# Patient Record
Sex: MALE | Race: WHITE | NOT HISPANIC OR LATINO | Employment: UNEMPLOYED | URBAN - METROPOLITAN AREA
[De-identification: names, ages, dates, MRNs, and addresses within clinical notes are randomized per-mention and may not be internally consistent; named-entity substitution may affect disease eponyms.]

---
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
## 2021-06-06 ENCOUNTER — HOSPITAL ENCOUNTER (EMERGENCY)
Facility: HOSPITAL | Age: 21
Discharge: HOME | End: 2021-06-07
Attending: EMERGENCY MEDICINE | Admitting: EMERGENCY MEDICINE

## 2021-06-06 ENCOUNTER — APPOINTMENT (OUTPATIENT)
Dept: CT IMAGING | Facility: HOSPITAL | Age: 21
End: 2021-06-06

## 2021-06-06 DIAGNOSIS — N20.0 KIDNEY STONE ON LEFT SIDE: Primary | ICD-10-CM

## 2021-06-06 LAB
ALBUMIN SERPL-MCNC: 4.6 G/DL (ref 3.5–5.2)
ALBUMIN/GLOB SERPL: 1.5 G/DL
ALP SERPL-CCNC: 83 U/L (ref 39–117)
ALT SERPL W P-5'-P-CCNC: 54 U/L (ref 1–41)
ANION GAP SERPL CALCULATED.3IONS-SCNC: 10.4 MMOL/L (ref 5–15)
AST SERPL-CCNC: 24 U/L (ref 1–40)
BACTERIA UR QL AUTO: NORMAL /HPF
BASOPHILS # BLD AUTO: 0.06 10*3/MM3 (ref 0–0.2)
BASOPHILS NFR BLD AUTO: 0.5 % (ref 0–1.5)
BILIRUB SERPL-MCNC: 0.6 MG/DL (ref 0–1.2)
BILIRUB UR QL STRIP: NEGATIVE
BUN SERPL-MCNC: 10 MG/DL (ref 6–20)
BUN/CREAT SERPL: 9.3 (ref 7–25)
CALCIUM SPEC-SCNC: 9.4 MG/DL (ref 8.6–10.5)
CHLORIDE SERPL-SCNC: 107 MMOL/L (ref 98–107)
CLARITY UR: CLEAR
CO2 SERPL-SCNC: 26.6 MMOL/L (ref 22–29)
COLOR UR: YELLOW
CREAT SERPL-MCNC: 1.08 MG/DL (ref 0.76–1.27)
DEPRECATED RDW RBC AUTO: 37.4 FL (ref 37–54)
EOSINOPHIL # BLD AUTO: 0.34 10*3/MM3 (ref 0–0.4)
EOSINOPHIL NFR BLD AUTO: 3 % (ref 0.3–6.2)
ERYTHROCYTE [DISTWIDTH] IN BLOOD BY AUTOMATED COUNT: 12.6 % (ref 12.3–15.4)
GFR SERPL CREATININE-BSD FRML MDRD: 88 ML/MIN/1.73
GLOBULIN UR ELPH-MCNC: 3.1 GM/DL
GLUCOSE SERPL-MCNC: 108 MG/DL (ref 65–99)
GLUCOSE UR STRIP-MCNC: NEGATIVE MG/DL
HCT VFR BLD AUTO: 44.3 % (ref 37.5–51)
HGB BLD-MCNC: 15.1 G/DL (ref 13–17.7)
HGB UR QL STRIP.AUTO: ABNORMAL
HYALINE CASTS UR QL AUTO: NORMAL /LPF
IMM GRANULOCYTES # BLD AUTO: 0.05 10*3/MM3 (ref 0–0.05)
IMM GRANULOCYTES NFR BLD AUTO: 0.4 % (ref 0–0.5)
KETONES UR QL STRIP: ABNORMAL
LEUKOCYTE ESTERASE UR QL STRIP.AUTO: NEGATIVE
LIPASE SERPL-CCNC: 19 U/L (ref 13–60)
LYMPHOCYTES # BLD AUTO: 4.08 10*3/MM3 (ref 0.7–3.1)
LYMPHOCYTES NFR BLD AUTO: 35.6 % (ref 19.6–45.3)
MCH RBC QN AUTO: 28.2 PG (ref 26.6–33)
MCHC RBC AUTO-ENTMCNC: 34.1 G/DL (ref 31.5–35.7)
MCV RBC AUTO: 82.8 FL (ref 79–97)
MONOCYTES # BLD AUTO: 0.66 10*3/MM3 (ref 0.1–0.9)
MONOCYTES NFR BLD AUTO: 5.8 % (ref 5–12)
NEUTROPHILS NFR BLD AUTO: 54.7 % (ref 42.7–76)
NEUTROPHILS NFR BLD AUTO: 6.26 10*3/MM3 (ref 1.7–7)
NITRITE UR QL STRIP: NEGATIVE
NRBC BLD AUTO-RTO: 0 /100 WBC (ref 0–0.2)
PH UR STRIP.AUTO: <=5 [PH] (ref 5–8)
PLATELET # BLD AUTO: 273 10*3/MM3 (ref 140–450)
PMV BLD AUTO: 10.2 FL (ref 6–12)
POTASSIUM SERPL-SCNC: 3.8 MMOL/L (ref 3.5–5.2)
PROT SERPL-MCNC: 7.7 G/DL (ref 6–8.5)
PROT UR QL STRIP: NEGATIVE
RBC # BLD AUTO: 5.35 10*6/MM3 (ref 4.14–5.8)
RBC # UR: NORMAL /HPF
REF LAB TEST METHOD: NORMAL
SODIUM SERPL-SCNC: 144 MMOL/L (ref 136–145)
SP GR UR STRIP: >=1.03 (ref 1–1.03)
SQUAMOUS #/AREA URNS HPF: NORMAL /HPF
UROBILINOGEN UR QL STRIP: ABNORMAL
WBC # BLD AUTO: 11.45 10*3/MM3 (ref 3.4–10.8)
WBC UR QL AUTO: NORMAL /HPF

## 2021-06-06 PROCEDURE — 81001 URINALYSIS AUTO W/SCOPE: CPT

## 2021-06-06 PROCEDURE — 83690 ASSAY OF LIPASE: CPT

## 2021-06-06 PROCEDURE — 25010000002 HYDROMORPHONE PER 4 MG: Performed by: EMERGENCY MEDICINE

## 2021-06-06 PROCEDURE — 80053 COMPREHEN METABOLIC PANEL: CPT

## 2021-06-06 PROCEDURE — 96375 TX/PRO/DX INJ NEW DRUG ADDON: CPT

## 2021-06-06 PROCEDURE — 96374 THER/PROPH/DIAG INJ IV PUSH: CPT

## 2021-06-06 PROCEDURE — 25010000002 ONDANSETRON PER 1 MG: Performed by: EMERGENCY MEDICINE

## 2021-06-06 PROCEDURE — 74176 CT ABD & PELVIS W/O CONTRAST: CPT

## 2021-06-06 PROCEDURE — 99283 EMERGENCY DEPT VISIT LOW MDM: CPT

## 2021-06-06 PROCEDURE — 85025 COMPLETE CBC W/AUTO DIFF WBC: CPT

## 2021-06-06 RX ORDER — ONDANSETRON 2 MG/ML
4 INJECTION INTRAMUSCULAR; INTRAVENOUS ONCE
Status: COMPLETED | OUTPATIENT
Start: 2021-06-06 | End: 2021-06-06

## 2021-06-06 RX ORDER — SODIUM CHLORIDE 0.9 % (FLUSH) 0.9 %
10 SYRINGE (ML) INJECTION AS NEEDED
Status: DISCONTINUED | OUTPATIENT
Start: 2021-06-06 | End: 2021-06-07 | Stop reason: HOSPADM

## 2021-06-06 RX ORDER — HYDROMORPHONE HYDROCHLORIDE 1 MG/ML
0.5 INJECTION, SOLUTION INTRAMUSCULAR; INTRAVENOUS; SUBCUTANEOUS ONCE
Status: COMPLETED | OUTPATIENT
Start: 2021-06-06 | End: 2021-06-06

## 2021-06-06 RX ADMIN — ONDANSETRON 4 MG: 2 INJECTION INTRAMUSCULAR; INTRAVENOUS at 23:32

## 2021-06-06 RX ADMIN — HYDROMORPHONE HYDROCHLORIDE 0.5 MG: 1 INJECTION, SOLUTION INTRAMUSCULAR; INTRAVENOUS; SUBCUTANEOUS at 23:32

## 2021-06-06 RX ADMIN — SODIUM CHLORIDE 1000 ML: 9 INJECTION, SOLUTION INTRAVENOUS at 23:31

## 2021-06-07 VITALS
HEART RATE: 53 BPM | HEIGHT: 74 IN | OXYGEN SATURATION: 99 % | SYSTOLIC BLOOD PRESSURE: 125 MMHG | DIASTOLIC BLOOD PRESSURE: 76 MMHG | TEMPERATURE: 97.3 F | RESPIRATION RATE: 16 BRPM

## 2021-06-07 LAB
HOLD SPECIMEN: NORMAL
HOLD SPECIMEN: NORMAL
WHOLE BLOOD HOLD SPECIMEN: NORMAL
WHOLE BLOOD HOLD SPECIMEN: NORMAL

## 2021-06-07 RX ORDER — ONDANSETRON 4 MG/1
4 TABLET, ORALLY DISINTEGRATING ORAL 4 TIMES DAILY PRN
Qty: 10 TABLET | Refills: 0 | Status: SHIPPED | OUTPATIENT
Start: 2021-06-07 | End: 2021-06-23 | Stop reason: SDUPTHER

## 2021-06-07 RX ORDER — OXYCODONE HYDROCHLORIDE AND ACETAMINOPHEN 5; 325 MG/1; MG/1
1 TABLET ORAL EVERY 6 HOURS PRN
Qty: 15 TABLET | Refills: 0 | Status: SHIPPED | OUTPATIENT
Start: 2021-06-07 | End: 2021-06-23

## 2021-06-07 NOTE — ED TRIAGE NOTES
Pt to ER from home via PV with c/o L flank pain starting approx 20 min ago. Pt reports some nausea, no vomiting yet.       Pt masked in triage, staff in appropriate ppe.

## 2021-06-07 NOTE — ED PROVIDER NOTES
EMERGENCY DEPARTMENT ENCOUNTER    Room Number:  20/20  PCP: Teri Jack APRN  Historian: Patient  History Limited By: Nothing      HPI  Chief Complaint: Left flank pain  Context: Santos Mendoza is a 20 y.o. male who presents to the ED c/o left flank pain.  Patient states symptoms started about 30 to 45 minutes ago.  Nauseated without vomiting.  Has no history of kidney stones but has had appendicitis.  Patient has had no fevers.  Some mild dysuria but no hematuria.  Pain does go around to the front.      Location: Left flank pain  Radiation: Left abdomen  Character: Aching  Duration: 45 minutes  Severity: Moderate  Progression: Waxes and wanes  Aggravating Factors: Nothing  Alleviating Factors: Nothing        MEDICAL RECORD REVIEW    No prior visits in epic for kidney stone          PAST MEDICAL HISTORY  Active Ambulatory Problems     Diagnosis Date Noted   • Abdominal pain 02/15/2017   • Heel pain, bilateral 02/15/2017   • Flu-like symptoms 02/15/2017   • Influenza A 02/15/2017     Resolved Ambulatory Problems     Diagnosis Date Noted   • No Resolved Ambulatory Problems     No Additional Past Medical History         PAST SURGICAL HISTORY  No past surgical history on file.      FAMILY HISTORY  Family History   Problem Relation Age of Onset   • Migraines Father          SOCIAL HISTORY  Social History     Socioeconomic History   • Marital status: Single     Spouse name: Not on file   • Number of children: Not on file   • Years of education: Not on file   • Highest education level: Not on file   Tobacco Use   • Smoking status: Never Smoker   Substance and Sexual Activity   • Alcohol use: No   • Drug use: No         ALLERGIES  Nsaids        REVIEW OF SYSTEMS  Review of Systems   Constitutional: Negative for activity change, appetite change and fever.   HENT: Negative for congestion and sore throat.    Eyes: Negative.    Respiratory: Negative for cough and shortness of breath.    Cardiovascular: Negative for chest pain  and leg swelling.   Gastrointestinal: Positive for abdominal pain and nausea. Negative for diarrhea and vomiting.   Endocrine: Negative.    Genitourinary: Positive for flank pain. Negative for decreased urine volume and dysuria.   Musculoskeletal: Negative for neck pain.   Skin: Negative for rash and wound.   Allergic/Immunologic: Negative.    Neurological: Negative for weakness, numbness and headaches.   Hematological: Negative.    Psychiatric/Behavioral: Negative.    All other systems reviewed and are negative.           PHYSICAL EXAM  ED Triage Vitals [06/06/21 2314]   Temp Heart Rate Resp BP SpO2   97.3 °F (36.3 °C) 53 18 -- 99 %      Temp src Heart Rate Source Patient Position BP Location FiO2 (%)   -- -- -- -- --       Physical Exam  Vitals and nursing note reviewed.   Constitutional:       General: He is not in acute distress.  HENT:      Head: Normocephalic and atraumatic.   Eyes:      Pupils: Pupils are equal, round, and reactive to light.   Cardiovascular:      Rate and Rhythm: Normal rate and regular rhythm.      Heart sounds: Normal heart sounds.   Pulmonary:      Effort: Pulmonary effort is normal. No respiratory distress.      Breath sounds: Normal breath sounds.   Abdominal:      Palpations: Abdomen is soft.      Tenderness: There is no abdominal tenderness. There is left CVA tenderness. There is no guarding or rebound.   Musculoskeletal:         General: Normal range of motion.      Cervical back: Normal range of motion and neck supple.   Skin:     General: Skin is warm and dry.   Neurological:      Mental Status: He is alert and oriented to person, place, and time.      Sensory: Sensation is intact.   Psychiatric:         Mood and Affect: Mood and affect normal.       Patient was wearing a face mask when I entered the room and they continued to wear a mask throughout their stay in the ED.  I wore PPE, including  gloves, face mask with shield or face mask with goggles whenever I was in the room with  patient.       LAB RESULTS  Recent Results (from the past 24 hour(s))   Comprehensive Metabolic Panel    Collection Time: 06/06/21 11:27 PM    Specimen: Blood   Result Value Ref Range    Glucose 108 (H) 65 - 99 mg/dL    BUN 10 6 - 20 mg/dL    Creatinine 1.08 0.76 - 1.27 mg/dL    Sodium 144 136 - 145 mmol/L    Potassium 3.8 3.5 - 5.2 mmol/L    Chloride 107 98 - 107 mmol/L    CO2 26.6 22.0 - 29.0 mmol/L    Calcium 9.4 8.6 - 10.5 mg/dL    Total Protein 7.7 6.0 - 8.5 g/dL    Albumin 4.60 3.50 - 5.20 g/dL    ALT (SGPT) 54 (H) 1 - 41 U/L    AST (SGOT) 24 1 - 40 U/L    Alkaline Phosphatase 83 39 - 117 U/L    Total Bilirubin 0.6 0.0 - 1.2 mg/dL    eGFR Non African Amer 88 >60 mL/min/1.73    Globulin 3.1 gm/dL    A/G Ratio 1.5 g/dL    BUN/Creatinine Ratio 9.3 7.0 - 25.0    Anion Gap 10.4 5.0 - 15.0 mmol/L   Lipase    Collection Time: 06/06/21 11:27 PM    Specimen: Blood   Result Value Ref Range    Lipase 19 13 - 60 U/L   Urinalysis With Microscopic If Indicated (No Culture) - Urine, Clean Catch    Collection Time: 06/06/21 11:27 PM    Specimen: Urine, Clean Catch   Result Value Ref Range    Color, UA Yellow Yellow, Straw    Appearance, UA Clear Clear    pH, UA <=5.0 5.0 - 8.0    Specific Gravity, UA >=1.030 1.005 - 1.030    Glucose, UA Negative Negative    Ketones, UA Trace (A) Negative    Bilirubin, UA Negative Negative    Blood, UA Large (3+) (A) Negative    Protein, UA Negative Negative    Leuk Esterase, UA Negative Negative    Nitrite, UA Negative Negative    Urobilinogen, UA 0.2 E.U./dL 0.2 - 1.0 E.U./dL   Light Blue Top    Collection Time: 06/06/21 11:27 PM   Result Value Ref Range    Extra Tube hold for add-on    Green Top (Gel)    Collection Time: 06/06/21 11:27 PM   Result Value Ref Range    Extra Tube Hold for add-ons.    Lavender Top    Collection Time: 06/06/21 11:27 PM   Result Value Ref Range    Extra Tube hold for add-on    Gold Top - SST    Collection Time: 06/06/21 11:27 PM   Result Value Ref Range    Extra  Tube Hold for add-ons.    CBC Auto Differential    Collection Time: 06/06/21 11:27 PM    Specimen: Blood   Result Value Ref Range    WBC 11.45 (H) 3.40 - 10.80 10*3/mm3    RBC 5.35 4.14 - 5.80 10*6/mm3    Hemoglobin 15.1 13.0 - 17.7 g/dL    Hematocrit 44.3 37.5 - 51.0 %    MCV 82.8 79.0 - 97.0 fL    MCH 28.2 26.6 - 33.0 pg    MCHC 34.1 31.5 - 35.7 g/dL    RDW 12.6 12.3 - 15.4 %    RDW-SD 37.4 37.0 - 54.0 fl    MPV 10.2 6.0 - 12.0 fL    Platelets 273 140 - 450 10*3/mm3    Neutrophil % 54.7 42.7 - 76.0 %    Lymphocyte % 35.6 19.6 - 45.3 %    Monocyte % 5.8 5.0 - 12.0 %    Eosinophil % 3.0 0.3 - 6.2 %    Basophil % 0.5 0.0 - 1.5 %    Immature Grans % 0.4 0.0 - 0.5 %    Neutrophils, Absolute 6.26 1.70 - 7.00 10*3/mm3    Lymphocytes, Absolute 4.08 (H) 0.70 - 3.10 10*3/mm3    Monocytes, Absolute 0.66 0.10 - 0.90 10*3/mm3    Eosinophils, Absolute 0.34 0.00 - 0.40 10*3/mm3    Basophils, Absolute 0.06 0.00 - 0.20 10*3/mm3    Immature Grans, Absolute 0.05 0.00 - 0.05 10*3/mm3    nRBC 0.0 0.0 - 0.2 /100 WBC   Urinalysis, Microscopic Only - Urine, Clean Catch    Collection Time: 06/06/21 11:27 PM    Specimen: Urine, Clean Catch   Result Value Ref Range    RBC, UA 0-2 None Seen, 0-2 /HPF    WBC, UA 0-2 None Seen, 0-2 /HPF    Bacteria, UA None Seen None Seen /HPF    Squamous Epithelial Cells, UA 0-2 None Seen, 0-2 /HPF    Hyaline Casts, UA 0-2 None Seen /LPF    Methodology Automated Microscopy        Ordered the above labs and reviewed the results.        RADIOLOGY  CT Abdomen Pelvis Without Contrast   Final Result         Electronically signed by Jaskaran Ramos M.D. on 06-07-21 at 0040           Ordered the above noted radiological studies. Reviewed by me in PACS.        PROCEDURES  Procedures          MEDICATIONS GIVEN IN ER  Medications   sodium chloride 0.9 % flush 10 mL (has no administration in time range)   HYDROmorphone (DILAUDID) injection 0.5 mg (0.5 mg Intravenous Given 6/6/21 6042)   ondansetron (ZOFRAN) injection 4 mg  (4 mg Intravenous Given 6/6/21 3032)   sodium chloride 0.9 % bolus 1,000 mL (0 mL Intravenous Stopped 6/7/21 0033)             PROGRESS AND CONSULTS  ED Course as of Jun 07 0148 Mon Jun 07, 2021   0116 01:16 EDT  Patient presents with pain and appears to have 2 mm distal stone.  Patient's pain is improved.  Will discharge home.  Will be given pain medicine.  Urine strainer.  Patient will be given urology to follow-up with.  No evidence of bowel obstruction, pancreatitis.    [SL]      ED Course User Index  [SL] Lee Polanco MD           MEDICAL DECISION MAKING      MDM  Number of Diagnoses or Management Options     Amount and/or Complexity of Data Reviewed  Clinical lab tests: reviewed and ordered (Urine positive for blood)  Tests in the radiology section of CPT®: reviewed and ordered (CT left-sided kidney stone)               DIAGNOSIS  Final diagnoses:   Kidney stone on left side           DISPOSITION  DISCHARGE    Patient discharged in stable condition.    Reviewed implications of results, diagnosis, meds, responsibility to follow up, warning signs and symptoms of possible worsening, potential complications and reasons to return to ER, including worsening pain, fevers.    Patient/Family voiced understanding of above instructions.    Discussed plan for discharge, as there is no emergent indication for admission. Patient referred to primary care provider for BP management due to today's BP. Pt/family is agreeable and understands need for follow up and repeat testing.  Pt is aware that discharge does not mean that nothing is wrong but it indicates no emergency is present that requires admission and they must continue care with follow-up as given below or physician of their choice.     FOLLOW-UP  [facility]  [address] [facility] [address]  [phone]  Schedule an appointment as soon as possible for a visit            Medication List      New Prescriptions    ondansetron ODT 4 MG  disintegrating tablet  Commonly known as: ZOFRAN-ODT  Place 1 tablet on the tongue 4 (Four) Times a Day As Needed for Nausea or Vomiting.     oxyCODONE-acetaminophen 5-325 MG per tablet  Commonly known as: PERCOCET  Take 1 tablet by mouth Every 6 (Six) Hours As Needed for Moderate Pain .           Where to Get Your Medications      These medications were sent to [facility]/pharmacy #[id number] - [facility], [place] - [address] AT [facility] [phone] [facility] [phone]   [address], [facility] [address]    Phone: [phone]   · ondansetron ODT 4 MG disintegrating tablet  · oxyCODONE-acetaminophen 5-325 MG per tablet             Latest Documented Vital Signs:  As of 01:48 EDT  BP- 125/76 HR- 53 Temp- 97.3 °F (36.3 °C) (Tympanic) O2 sat- 99%                       Lee Polanco MD  06/07/21 0149

## 2021-06-15 ENCOUNTER — HOSPITAL ENCOUNTER (EMERGENCY)
Facility: HOSPITAL | Age: 21
Discharge: LEFT BEFORE DISCHARGE | End: 2021-06-15

## 2021-06-15 VITALS — OXYGEN SATURATION: 99 % | HEART RATE: 117 BPM | RESPIRATION RATE: 20 BRPM | TEMPERATURE: 97.5 F

## 2021-06-15 PROCEDURE — 99211 OFF/OP EST MAY X REQ PHY/QHP: CPT

## 2021-06-15 RX ORDER — SODIUM CHLORIDE 0.9 % (FLUSH) 0.9 %
10 SYRINGE (ML) INJECTION AS NEEDED
Status: DISCONTINUED | OUTPATIENT
Start: 2021-06-15 | End: 2021-06-16 | Stop reason: HOSPADM

## 2021-06-16 NOTE — ED TRIAGE NOTES
Pt arrives via PV. Pt reports L sided flank pain. Pt reports being seen here last week and diagnosed with L sided kidney stone. Pt also complains of burning with urination. Pt was sent home with pain medication but has not taken any yet.     Pt masked on arrival, staff masked

## 2021-06-23 ENCOUNTER — OFFICE VISIT (OUTPATIENT)
Dept: FAMILY MEDICINE CLINIC | Facility: CLINIC | Age: 21
End: 2021-06-23

## 2021-06-23 VITALS
DIASTOLIC BLOOD PRESSURE: 80 MMHG | HEART RATE: 82 BPM | OXYGEN SATURATION: 99 % | WEIGHT: 303.9 LBS | HEIGHT: 74 IN | TEMPERATURE: 98.2 F | BODY MASS INDEX: 39 KG/M2 | SYSTOLIC BLOOD PRESSURE: 116 MMHG

## 2021-06-23 DIAGNOSIS — N20.0 KIDNEY STONES: Primary | ICD-10-CM

## 2021-06-23 DIAGNOSIS — N13.39 OTHER HYDRONEPHROSIS: ICD-10-CM

## 2021-06-23 DIAGNOSIS — Z96.0 STATUS POST PLACEMENT OF URETERAL STENT: ICD-10-CM

## 2021-06-23 DIAGNOSIS — R30.0 DYSURIA: ICD-10-CM

## 2021-06-23 DIAGNOSIS — N30.90 CYSTITIS: ICD-10-CM

## 2021-06-23 LAB
BILIRUB BLD-MCNC: NEGATIVE MG/DL
CLARITY, POC: CLEAR
COLOR UR: ABNORMAL
GLUCOSE UR STRIP-MCNC: NEGATIVE MG/DL
KETONES UR QL: NEGATIVE
LEUKOCYTE EST, POC: NEGATIVE
NITRITE UR-MCNC: NEGATIVE MG/ML
PH UR: 5 [PH] (ref 5–8)
PROT UR STRIP-MCNC: ABNORMAL MG/DL
RBC # UR STRIP: ABNORMAL /UL
SP GR UR: 1.03 (ref 1–1.03)
UROBILINOGEN UR QL: NORMAL

## 2021-06-23 PROCEDURE — 81003 URINALYSIS AUTO W/O SCOPE: CPT | Performed by: FAMILY MEDICINE

## 2021-06-23 PROCEDURE — 99204 OFFICE O/P NEW MOD 45 MIN: CPT | Performed by: FAMILY MEDICINE

## 2021-06-23 RX ORDER — HYDROCODONE BITARTRATE AND ACETAMINOPHEN 5; 325 MG/1; MG/1
1 TABLET ORAL
COMMUNITY
Start: 2021-06-16 | End: 2022-05-18

## 2021-06-23 RX ORDER — ONDANSETRON 4 MG/1
4 TABLET, FILM COATED ORAL
COMMUNITY
Start: 2021-06-16 | End: 2022-05-18

## 2021-06-23 RX ORDER — CIPROFLOXACIN 500 MG/1
500 TABLET, FILM COATED ORAL 2 TIMES DAILY
Qty: 14 TABLET | Refills: 0 | Status: SHIPPED | OUTPATIENT
Start: 2021-06-23 | End: 2021-06-30

## 2021-06-23 NOTE — PROGRESS NOTES
Chief Complaint   Patient presents with   • Establish Care   • ER F/U     Kidney stones        Subjective   Santos Mendoza is a 20 y.o. male.     History of Present Illness   20-year-old male here to establish care as new patient    Has medical history of class III obesity as well as renal stones    Previously saw Dr Bonilla at this office    Recent ER visit for renal stones. Found to have hydronephrosis and hydroureter secondary to a 2mm obstructing stone at the UVJ. He was admitted and he was seen in consultation by urologist and has since undergone a cystoscopy with stent insertion on 6/16/21.  He was then discharged for outpatient urology and PCP follow-up.    He had been doing well. The flank pain had resolved mostly just a dull ache in his left flank. He continues to have dull ache in his left flank worse when he urinates.    This morning however he noticed a new cloudiness to his urine as well as some discomfort while urinating. He has been drinking fluids. He did not note this prior to today.    The following portions of the patient's history were reviewed and updated as appropriate: allergies, current medications, past family history, past medical history, past social history, past surgical history and problem list.      History reviewed. No pertinent past medical history.    Past Surgical History:   Procedure Laterality Date   • KIDNEY STONE SURGERY  06/15/2021       Family History   Problem Relation Age of Onset   • Migraines Father        Social History     Socioeconomic History   • Marital status: Single     Spouse name: Not on file   • Number of children: Not on file   • Years of education: Not on file   • Highest education level: Not on file   Tobacco Use   • Smoking status: Former Smoker     Packs/day: 1.00     Years: 1.00     Pack years: 1.00   • Smokeless tobacco: Never Used   Vaping Use   • Vaping Use: Never used   Substance and Sexual Activity   • Alcohol use: No   • Drug use: No   • Sexual  activity: Not Currently       Current Outpatient Medications on File Prior to Visit   Medication Sig Dispense Refill   • HYDROcodone-acetaminophen (NORCO) 5-325 MG per tablet Take 1 tablet by mouth.     • ondansetron (ZOFRAN) 4 MG tablet Take 4 mg by mouth.     • [DISCONTINUED] benzonatate (TESSALON) 200 MG capsule Take 1 capsule by mouth 3 (Three) Times a Day As Needed for cough. 30 capsule 0   • [DISCONTINUED] ondansetron ODT (ZOFRAN-ODT) 4 MG disintegrating tablet Place 1 tablet on the tongue 4 (Four) Times a Day As Needed for Nausea or Vomiting. 10 tablet 0   • [DISCONTINUED] oseltamivir (TAMIFLU) 75 MG capsule Take 1 capsule by mouth 2 (Two) Times a Day. 10 capsule 0   • [DISCONTINUED] oxyCODONE-acetaminophen (PERCOCET) 5-325 MG per tablet Take 1 tablet by mouth Every 6 (Six) Hours As Needed for Moderate Pain . 15 tablet 0     No current facility-administered medications on file prior to visit.       Review of Systems   Constitutional: Negative for activity change, chills and fever.   HENT: Negative for congestion, postnasal drip and rhinorrhea.    Eyes: Negative for blurred vision and pain.   Respiratory: Negative for cough, chest tightness and shortness of breath.    Cardiovascular: Negative for chest pain.   Gastrointestinal: Negative for abdominal pain, constipation, diarrhea, nausea and vomiting.   Endocrine: Negative for cold intolerance and heat intolerance.   Genitourinary: Negative for decreased urine volume, dysuria and frequency.   Musculoskeletal: Negative for arthralgias, back pain and myalgias.   Skin: Negative for rash and skin lesions.   Neurological: Negative for dizziness and confusion.   Psychiatric/Behavioral: Negative for agitation, behavioral problems and depressed mood.           Vitals:    06/23/21 1358   BP: 116/80   Pulse: 82   Temp: 98.2 °F (36.8 °C)   SpO2: 99%      Objective   Physical Exam  Vitals and nursing note reviewed.   Constitutional:       Appearance: He is well-developed.    HENT:      Head: Normocephalic and atraumatic.   Eyes:      Conjunctiva/sclera: Conjunctivae normal.      Pupils: Pupils are equal, round, and reactive to light.   Cardiovascular:      Rate and Rhythm: Normal rate and regular rhythm.      Heart sounds: Normal heart sounds. No murmur heard.     Pulmonary:      Effort: Pulmonary effort is normal. No respiratory distress.      Breath sounds: Normal breath sounds.   Abdominal:      General: Bowel sounds are normal.      Palpations: Abdomen is soft.   Musculoskeletal:         General: Normal range of motion.      Cervical back: Neck supple.   Skin:     General: Skin is warm and dry.   Neurological:      Mental Status: He is alert and oriented to person, place, and time.           Assessment/Plan   Problems Addressed this Visit     None      Visit Diagnoses     Kidney stones    -  Primary    Relevant Medications    HYDROcodone-acetaminophen (NORCO) 5-325 MG per tablet    ciprofloxacin (Cipro) 500 MG tablet    Other Relevant Orders    POCT urinalysis dipstick, automated (Completed)    Urine Culture - Urine, Urine, Clean Catch    Ambulatory Referral to Urology    Basic Metabolic Panel    CBC & Differential    Body mass index (BMI) of 39.0 to 39.9 in adult        Dysuria        Relevant Medications    ciprofloxacin (Cipro) 500 MG tablet    Other Relevant Orders    POCT urinalysis dipstick, automated (Completed)    Urine Culture - Urine, Urine, Clean Catch    Ambulatory Referral to Urology    Basic Metabolic Panel    CBC & Differential    Other hydronephrosis        Relevant Medications    ciprofloxacin (Cipro) 500 MG tablet    Other Relevant Orders    Ambulatory Referral to Urology    Basic Metabolic Panel    CBC & Differential    Status post placement of ureteral stent        Relevant Medications    ciprofloxacin (Cipro) 500 MG tablet    Other Relevant Orders    Ambulatory Referral to Urology    Basic Metabolic Panel    CBC & Differential    Cystitis        Relevant  Medications    ciprofloxacin (Cipro) 500 MG tablet    Other Relevant Orders    Ambulatory Referral to Urology    Basic Metabolic Panel    CBC & Differential      Diagnoses       Codes Comments    Kidney stones    -  Primary ICD-10-CM: N20.0  ICD-9-CM: 592.0     Body mass index (BMI) of 39.0 to 39.9 in adult     ICD-10-CM: Z68.39  ICD-9-CM: V85.39     Dysuria     ICD-10-CM: R30.0  ICD-9-CM: 788.1     Other hydronephrosis     ICD-10-CM: N13.39  ICD-9-CM: 591     Status post placement of ureteral stent     ICD-10-CM: Z96.0  ICD-9-CM: V45.89     Cystitis     ICD-10-CM: N30.90  ICD-9-CM: 595.9         S/p Left ureteral stent placement    -New episode of cloudy urine today. Proteinuria and blood in urine today, urine cx pending   -Stat urology referral to Dr. Coats  -We will also start him empirically on Cipro, to obtain BMP and CBC    If symptoms worsen or his urination decreases he is to seek more urgent care in the ER in the interim. Increase hydration            Return if symptoms worsen or fail to improve.Will get him scheduled with Urology by tomorrow or will get him more urgent care    Referrals reached out to [facility] for urgent appointment after issue s/p procedure, they want to schedule him with Dr Coats not first available provider and state they will schedule f/u    Otherwise if symptoms worsen patient to go to ER    Sera Lopez MD

## 2021-06-25 LAB
BACTERIA UR CULT: NO GROWTH
BACTERIA UR CULT: NORMAL
BASOPHILS # BLD AUTO: 0.07 10*3/MM3 (ref 0–0.2)
BASOPHILS NFR BLD AUTO: 0.7 % (ref 0–1.5)
BUN SERPL-MCNC: 12 MG/DL (ref 6–20)
BUN/CREAT SERPL: 11 (ref 7–25)
CALCIUM SERPL-MCNC: 10.1 MG/DL (ref 8.6–10.5)
CHLORIDE SERPL-SCNC: 102 MMOL/L (ref 98–107)
CO2 SERPL-SCNC: 25.1 MMOL/L (ref 22–29)
CREAT SERPL-MCNC: 1.09 MG/DL (ref 0.76–1.27)
EOSINOPHIL # BLD AUTO: 0.35 10*3/MM3 (ref 0–0.4)
EOSINOPHIL NFR BLD AUTO: 3.4 % (ref 0.3–6.2)
ERYTHROCYTE [DISTWIDTH] IN BLOOD BY AUTOMATED COUNT: 12.9 % (ref 12.3–15.4)
GLUCOSE SERPL-MCNC: 92 MG/DL (ref 65–99)
HCT VFR BLD AUTO: 47 % (ref 37.5–51)
HGB BLD-MCNC: 15.3 G/DL (ref 13–17.7)
IMM GRANULOCYTES # BLD AUTO: 0.02 10*3/MM3 (ref 0–0.05)
IMM GRANULOCYTES NFR BLD AUTO: 0.2 % (ref 0–0.5)
LYMPHOCYTES # BLD AUTO: 4.29 10*3/MM3 (ref 0.7–3.1)
LYMPHOCYTES NFR BLD AUTO: 42.1 % (ref 19.6–45.3)
MCH RBC QN AUTO: 27.6 PG (ref 26.6–33)
MCHC RBC AUTO-ENTMCNC: 32.6 G/DL (ref 31.5–35.7)
MCV RBC AUTO: 84.7 FL (ref 79–97)
MONOCYTES # BLD AUTO: 0.71 10*3/MM3 (ref 0.1–0.9)
MONOCYTES NFR BLD AUTO: 7 % (ref 5–12)
NEUTROPHILS # BLD AUTO: 4.74 10*3/MM3 (ref 1.7–7)
NEUTROPHILS NFR BLD AUTO: 46.6 % (ref 42.7–76)
NRBC BLD AUTO-RTO: 0 /100 WBC (ref 0–0.2)
PLATELET # BLD AUTO: 247 10*3/MM3 (ref 140–450)
POTASSIUM SERPL-SCNC: 4.1 MMOL/L (ref 3.5–5.2)
RBC # BLD AUTO: 5.55 10*6/MM3 (ref 4.14–5.8)
SODIUM SERPL-SCNC: 139 MMOL/L (ref 136–145)
WBC # BLD AUTO: 10.18 10*3/MM3 (ref 3.4–10.8)

## 2021-07-07 ENCOUNTER — OFFICE VISIT (OUTPATIENT)
Dept: FAMILY MEDICINE CLINIC | Facility: CLINIC | Age: 21
End: 2021-07-07

## 2021-07-07 VITALS
HEIGHT: 74 IN | DIASTOLIC BLOOD PRESSURE: 80 MMHG | OXYGEN SATURATION: 98 % | TEMPERATURE: 97.5 F | SYSTOLIC BLOOD PRESSURE: 120 MMHG | HEART RATE: 70 BPM | WEIGHT: 306 LBS | BODY MASS INDEX: 39.27 KG/M2

## 2021-07-07 DIAGNOSIS — N20.0 RENAL STONES: Primary | ICD-10-CM

## 2021-07-07 PROCEDURE — 99213 OFFICE O/P EST LOW 20 MIN: CPT | Performed by: FAMILY MEDICINE

## 2021-07-07 NOTE — PROGRESS NOTES
Chief Complaint   Patient presents with   • kidney stones f/u       Subjective   Santos Mendoza is a 20 y.o. male.     History of Present Illness   20-year-old male here for follow-up after kidney stones    Had ureteral stent placed.  Had it removed by urology and since it was removed all of his symptoms have resolved.  He feels well.  No issues with urination.  No subsequent stones or renal colic episodes.    BMI 39.  He states that the majority of what he drank had been sodas with sugar.  He has since stopped this.  He is only drinking water.  He has made other dietary changes including decreasing transfats and sodium in his diet.  His blood sugars on his last BMP were normal.    The following portions of the patient's history were reviewed and updated as appropriate: allergies, current medications, past family history, past medical history, past social history, past surgical history and problem list.      History reviewed. No pertinent past medical history.    Past Surgical History:   Procedure Laterality Date   • KIDNEY STONE SURGERY  06/15/2021       Family History   Problem Relation Age of Onset   • Migraines Father        Social History     Socioeconomic History   • Marital status: Single     Spouse name: Not on file   • Number of children: Not on file   • Years of education: Not on file   • Highest education level: Not on file   Tobacco Use   • Smoking status: Former Smoker     Packs/day: 1.00     Years: 1.00     Pack years: 1.00   • Smokeless tobacco: Never Used   Vaping Use   • Vaping Use: Never used   Substance and Sexual Activity   • Alcohol use: No   • Drug use: No   • Sexual activity: Not Currently       Current Outpatient Medications on File Prior to Visit   Medication Sig Dispense Refill   • HYDROcodone-acetaminophen (NORCO) 5-325 MG per tablet Take 1 tablet by mouth.     • ondansetron (ZOFRAN) 4 MG tablet Take 4 mg by mouth.       No current facility-administered medications on file prior to visit.        Review of Systems   Constitutional: Negative for chills and fever.   Respiratory: Negative for shortness of breath and wheezing.    Cardiovascular: Negative for chest pain.   Gastrointestinal: Negative for abdominal pain.   Genitourinary: Negative for urgency.   Skin: Negative for dry skin.   Neurological: Negative for dizziness and weakness.           Vitals:    07/07/21 1336   BP: 120/80   Pulse: 70   Temp: 97.5 °F (36.4 °C)   SpO2: 98%      Objective   Physical Exam  Vitals and nursing note reviewed.   Constitutional:       Appearance: He is well-developed.   Eyes:      Conjunctiva/sclera: Conjunctivae normal.      Pupils: Pupils are equal, round, and reactive to light.   Cardiovascular:      Rate and Rhythm: Normal rate and regular rhythm.      Heart sounds: Normal heart sounds. No murmur heard.     Pulmonary:      Effort: Pulmonary effort is normal. No respiratory distress.      Breath sounds: Normal breath sounds.   Abdominal:      General: Bowel sounds are normal.      Palpations: Abdomen is soft.   Musculoskeletal:         General: Normal range of motion.      Cervical back: Neck supple.   Skin:     General: Skin is warm and dry.   Neurological:      Mental Status: He is alert and oriented to person, place, and time.           Assessment/Plan   Problems Addressed this Visit     None      Visit Diagnoses     Renal stones    -  Primary    Body mass index (BMI) of 39.0 to 39.9 in adult          Diagnoses       Codes Comments    Renal stones    -  Primary ICD-10-CM: N20.0  ICD-9-CM: 592.0     Body mass index (BMI) of 39.0 to 39.9 in adult     ICD-10-CM: Z68.39  ICD-9-CM: V85.39       Would encourage dietary changes including eating 3 meals a day avoiding snacking.  Stopping eating at 6 PM.  Eating lots of protein and fruits and vegetables.  Avoiding processed foods and refined sugars    Body mass index is 39.29 kg/m².    Increase water intake.  He is already made good changes with discontinuing soft  drinks    No subsequent episodes of renal stones, has Urology f/u       Return for Annual.  Would follow-up in 6 months to reassess progress and for annual      Sera Lopez MD

## 2022-05-18 ENCOUNTER — OFFICE VISIT (OUTPATIENT)
Dept: FAMILY MEDICINE CLINIC | Facility: CLINIC | Age: 22
End: 2022-05-18

## 2022-05-18 VITALS
SYSTOLIC BLOOD PRESSURE: 121 MMHG | OXYGEN SATURATION: 98 % | BODY MASS INDEX: 35.94 KG/M2 | HEART RATE: 101 BPM | WEIGHT: 280 LBS | DIASTOLIC BLOOD PRESSURE: 80 MMHG | HEIGHT: 74 IN | TEMPERATURE: 97.8 F

## 2022-05-18 DIAGNOSIS — Q55.22 RETRACTILE TESTIS: ICD-10-CM

## 2022-05-18 DIAGNOSIS — N50.811 TESTICULAR PAIN, RIGHT: Primary | ICD-10-CM

## 2022-05-18 PROCEDURE — 99214 OFFICE O/P EST MOD 30 MIN: CPT | Performed by: FAMILY MEDICINE

## 2022-05-18 NOTE — PROGRESS NOTES
Chief Complaint   Patient presents with   • Groin Pain       Subjective   Santos Mendoza is a 21 y.o. male.     History of Present Illness   3 weeks ago he had an episode of significant right testicular pain lasted briefly then resolved spontaneously     He describes the brief pain episode as severe.  That went away he is now having chronic right mild testicular discomfort x weeks.  No dysuria. No scrotal swelling or other symptoms. He states possible concern for retractile testicle.  He has never had this before.  He denies any swelling of the scrotal area or warmth or redness.    The following portions of the patient's history were reviewed and updated as appropriate: allergies, current medications, past family history, past medical history, past social history, past surgical history and problem list.      No past medical history on file.    Past Surgical History:   Procedure Laterality Date   • KIDNEY STONE SURGERY  06/15/2021       Family History   Problem Relation Age of Onset   • Migraines Father        Social History     Socioeconomic History   • Marital status: Single   Tobacco Use   • Smoking status: Current Every Day Smoker     Packs/day: 1.00     Years: 1.00     Pack years: 1.00   • Smokeless tobacco: Current User   • Tobacco comment: vape   Vaping Use   • Vaping Use: Every day   • Substances: Nicotine   • Devices: Disposable   Substance and Sexual Activity   • Alcohol use: No   • Drug use: No   • Sexual activity: Not Currently       Current Outpatient Medications on File Prior to Visit   Medication Sig Dispense Refill   • [DISCONTINUED] HYDROcodone-acetaminophen (NORCO) 5-325 MG per tablet Take 1 tablet by mouth.     • [DISCONTINUED] ondansetron (ZOFRAN) 4 MG tablet Take 4 mg by mouth.       No current facility-administered medications on file prior to visit.       Review of Systems   Constitutional: Negative for fever.   HENT: Negative for congestion.    Respiratory: Negative for shortness of breath.     Cardiovascular: Negative for chest pain.   Gastrointestinal: Negative for abdominal pain.   Genitourinary: Negative for urgency.           Vitals:    05/18/22 1542   BP: 121/80   Pulse: 101   Temp:    SpO2:       Objective   Physical Exam  Vitals reviewed.   Cardiovascular:      Rate and Rhythm: Normal rate.   Pulmonary:      Effort: Pulmonary effort is normal.   Neurological:      General: No focal deficit present.      Mental Status: He is alert.       No scrotal swelling or lesion. No redness or discoloration. No hardness of region. Patient anxious regarding testicular palpation, describes mild discomfort.     Assessment & Plan   Problems Addressed this Visit    None     Visit Diagnoses     Testicular pain, right    -  Primary    Relevant Orders    Ambulatory Referral to Urology      Diagnoses       Codes Comments    Testicular pain, right    -  Primary ICD-10-CM: N50.811  ICD-9-CM: 608.9         Given brief episode of severe pain 3 wks prior related to right testicle we will place referral for evaluation by [facility]       If he is not able to get seen, we will proceed with ultrasound. If he develops a recurrent episode he is to seek more urgent care in the ER for more urgent u/s     Sera Lopez MD         MVC

## 2023-09-23 ENCOUNTER — HOSPITAL ENCOUNTER (EMERGENCY)
Facility: HOSPITAL | Age: 23
Discharge: HOME | End: 2023-09-23
Attending: EMERGENCY MEDICINE
Payer: COMMERCIAL

## 2023-09-23 ENCOUNTER — APPOINTMENT (OUTPATIENT)
Dept: CT IMAGING | Facility: HOSPITAL | Age: 23
End: 2023-09-23
Payer: COMMERCIAL

## 2023-09-23 VITALS
WEIGHT: 280 LBS | TEMPERATURE: 96.9 F | RESPIRATION RATE: 18 BRPM | OXYGEN SATURATION: 92 % | SYSTOLIC BLOOD PRESSURE: 111 MMHG | DIASTOLIC BLOOD PRESSURE: 63 MMHG | HEIGHT: 74 IN | HEART RATE: 58 BPM | BODY MASS INDEX: 35.94 KG/M2

## 2023-09-23 DIAGNOSIS — N20.0 KIDNEY STONE: Primary | ICD-10-CM

## 2023-09-23 LAB
ALBUMIN SERPL-MCNC: 4.4 G/DL (ref 3.5–5.2)
ALBUMIN/GLOB SERPL: 1.4 G/DL
ALP SERPL-CCNC: 74 U/L (ref 39–117)
ALT SERPL W P-5'-P-CCNC: 40 U/L (ref 1–41)
ANION GAP SERPL CALCULATED.3IONS-SCNC: 11.8 MMOL/L (ref 5–15)
AST SERPL-CCNC: 24 U/L (ref 1–40)
BACTERIA UR QL AUTO: ABNORMAL /HPF
BASOPHILS # BLD AUTO: 0.05 10*3/MM3 (ref 0–0.2)
BASOPHILS NFR BLD AUTO: 0.5 % (ref 0–1.5)
BILIRUB SERPL-MCNC: 0.9 MG/DL (ref 0–1.2)
BILIRUB UR QL STRIP: NEGATIVE
BUN SERPL-MCNC: 13 MG/DL (ref 6–20)
BUN/CREAT SERPL: 11.3 (ref 7–25)
CALCIUM SPEC-SCNC: 9.7 MG/DL (ref 8.6–10.5)
CHLORIDE SERPL-SCNC: 104 MMOL/L (ref 98–107)
CLARITY UR: ABNORMAL
CO2 SERPL-SCNC: 23.2 MMOL/L (ref 22–29)
COLOR UR: ABNORMAL
CREAT SERPL-MCNC: 1.15 MG/DL (ref 0.76–1.27)
DEPRECATED RDW RBC AUTO: 40.5 FL (ref 37–54)
EGFRCR SERPLBLD CKD-EPI 2021: 92.9 ML/MIN/1.73
EOSINOPHIL # BLD AUTO: 0.27 10*3/MM3 (ref 0–0.4)
EOSINOPHIL NFR BLD AUTO: 2.5 % (ref 0.3–6.2)
ERYTHROCYTE [DISTWIDTH] IN BLOOD BY AUTOMATED COUNT: 13.3 % (ref 12.3–15.4)
GLOBULIN UR ELPH-MCNC: 3.1 GM/DL
GLUCOSE SERPL-MCNC: 137 MG/DL (ref 65–99)
GLUCOSE UR STRIP-MCNC: NEGATIVE MG/DL
HCT VFR BLD AUTO: 43.6 % (ref 37.5–51)
HGB BLD-MCNC: 14.5 G/DL (ref 13–17.7)
HGB UR QL STRIP.AUTO: ABNORMAL
HOLD SPECIMEN: NORMAL
HYALINE CASTS UR QL AUTO: ABNORMAL /LPF
IMM GRANULOCYTES # BLD AUTO: 0.03 10*3/MM3 (ref 0–0.05)
IMM GRANULOCYTES NFR BLD AUTO: 0.3 % (ref 0–0.5)
KETONES UR QL STRIP: ABNORMAL
LEUKOCYTE ESTERASE UR QL STRIP.AUTO: NEGATIVE
LYMPHOCYTES # BLD AUTO: 3.38 10*3/MM3 (ref 0.7–3.1)
LYMPHOCYTES NFR BLD AUTO: 31.5 % (ref 19.6–45.3)
MCH RBC QN AUTO: 27.7 PG (ref 26.6–33)
MCHC RBC AUTO-ENTMCNC: 33.3 G/DL (ref 31.5–35.7)
MCV RBC AUTO: 83.4 FL (ref 79–97)
MONOCYTES # BLD AUTO: 0.79 10*3/MM3 (ref 0.1–0.9)
MONOCYTES NFR BLD AUTO: 7.4 % (ref 5–12)
NEUTROPHILS NFR BLD AUTO: 57.8 % (ref 42.7–76)
NEUTROPHILS NFR BLD AUTO: 6.22 10*3/MM3 (ref 1.7–7)
NITRITE UR QL STRIP: NEGATIVE
NRBC BLD AUTO-RTO: 0 /100 WBC (ref 0–0.2)
PH UR STRIP.AUTO: <=5 [PH] (ref 5–8)
PLATELET # BLD AUTO: 267 10*3/MM3 (ref 140–450)
PMV BLD AUTO: 9.6 FL (ref 6–12)
POTASSIUM SERPL-SCNC: 4.1 MMOL/L (ref 3.5–5.2)
PROT SERPL-MCNC: 7.5 G/DL (ref 6–8.5)
PROT UR QL STRIP: ABNORMAL
RBC # BLD AUTO: 5.23 10*6/MM3 (ref 4.14–5.8)
RBC # UR STRIP: ABNORMAL /HPF
REF LAB TEST METHOD: ABNORMAL
SODIUM SERPL-SCNC: 139 MMOL/L (ref 136–145)
SP GR UR STRIP: >=1.03 (ref 1–1.03)
SQUAMOUS #/AREA URNS HPF: ABNORMAL /HPF
UROBILINOGEN UR QL STRIP: ABNORMAL
WBC # UR STRIP: ABNORMAL /HPF
WBC NRBC COR # BLD: 10.74 10*3/MM3 (ref 3.4–10.8)
WHOLE BLOOD HOLD COAG: NORMAL
WHOLE BLOOD HOLD SPECIMEN: NORMAL

## 2023-09-23 PROCEDURE — 80053 COMPREHEN METABOLIC PANEL: CPT | Performed by: EMERGENCY MEDICINE

## 2023-09-23 PROCEDURE — 96376 TX/PRO/DX INJ SAME DRUG ADON: CPT

## 2023-09-23 PROCEDURE — 25010000002 DROPERIDOL PER 5 MG: Performed by: EMERGENCY MEDICINE

## 2023-09-23 PROCEDURE — 81001 URINALYSIS AUTO W/SCOPE: CPT | Performed by: EMERGENCY MEDICINE

## 2023-09-23 PROCEDURE — 85025 COMPLETE CBC W/AUTO DIFF WBC: CPT | Performed by: EMERGENCY MEDICINE

## 2023-09-23 PROCEDURE — 25010000002 ONDANSETRON PER 1 MG: Performed by: EMERGENCY MEDICINE

## 2023-09-23 PROCEDURE — 99284 EMERGENCY DEPT VISIT MOD MDM: CPT

## 2023-09-23 PROCEDURE — 96375 TX/PRO/DX INJ NEW DRUG ADDON: CPT

## 2023-09-23 PROCEDURE — 96374 THER/PROPH/DIAG INJ IV PUSH: CPT

## 2023-09-23 PROCEDURE — 25010000002 HYDROMORPHONE 1 MG/ML SOLUTION: Performed by: EMERGENCY MEDICINE

## 2023-09-23 PROCEDURE — 74176 CT ABD & PELVIS W/O CONTRAST: CPT

## 2023-09-23 RX ORDER — OXYCODONE AND ACETAMINOPHEN 7.5; 325 MG/1; MG/1
1-2 TABLET ORAL EVERY 6 HOURS PRN
Qty: 12 TABLET | Refills: 0 | Status: SHIPPED | OUTPATIENT
Start: 2023-09-23

## 2023-09-23 RX ORDER — PROMETHAZINE HYDROCHLORIDE 25 MG/1
25 TABLET ORAL EVERY 6 HOURS PRN
Qty: 10 TABLET | Refills: 0 | Status: SHIPPED | OUTPATIENT
Start: 2023-09-23

## 2023-09-23 RX ORDER — ONDANSETRON 2 MG/ML
4 INJECTION INTRAMUSCULAR; INTRAVENOUS ONCE
Status: COMPLETED | OUTPATIENT
Start: 2023-09-23 | End: 2023-09-23

## 2023-09-23 RX ORDER — DROPERIDOL 2.5 MG/ML
2.5 INJECTION, SOLUTION INTRAMUSCULAR; INTRAVENOUS ONCE
Status: COMPLETED | OUTPATIENT
Start: 2023-09-23 | End: 2023-09-23

## 2023-09-23 RX ADMIN — ONDANSETRON 4 MG: 2 INJECTION INTRAMUSCULAR; INTRAVENOUS at 13:42

## 2023-09-23 RX ADMIN — HYDROMORPHONE HYDROCHLORIDE 1 MG: 1 INJECTION, SOLUTION INTRAMUSCULAR; INTRAVENOUS; SUBCUTANEOUS at 13:43

## 2023-09-23 RX ADMIN — HYDROMORPHONE HYDROCHLORIDE 1 MG: 1 INJECTION, SOLUTION INTRAMUSCULAR; INTRAVENOUS; SUBCUTANEOUS at 15:14

## 2023-09-23 RX ADMIN — DROPERIDOL 2.5 MG: 2.5 INJECTION, SOLUTION INTRAMUSCULAR; INTRAVENOUS at 15:14

## 2023-09-23 NOTE — Clinical Note
[facility] EMERGENCY DEPARTMENT  [address]  [place] [address]  Phone: [phone]    Santos Mendoza was seen and treated in our emergency department on 9/23/2023.  He may return to work on 09/27/2023.         Thank you for choosing [facility].    Jeffrey Ferguson MD

## 2023-09-23 NOTE — ED PROVIDER NOTES
EMERGENCY DEPARTMENT ENCOUNTER    Room Number:  39/39  Date seen:  9/23/2023  PCP: Sera Lopez MD  Historian: Patient, mother at bedside      HPI:  Chief Complaint: Flank pain  A complete HPI/ROS/PMH/PSH/SH/FH are unobtainable due to:   Context: Santos Mendoza is a 22 y.o. male who presents to the ED c/o flank pain.  Onset of pain about 45 minutes ago abruptly.  Pain in the right flank and aching and throbbing.  Pain is similar to prior kidney stones.  He has nausea but does not have vomiting.  Denies dysuria or hematuria.  No problems with bowels.  Patient works at a [employer] in [place].  Denies known recent medical illness.      MEDICAL RECORD REVIEW (non ED)  I reviewed prior medical records including most recent note with primary care provider, Dr. Sera Lopez.  Patient does have history of kidney stones.    PAST MEDICAL HISTORY  Active Ambulatory Problems     Diagnosis Date Noted    Abdominal pain 02/15/2017    Heel pain, bilateral 02/15/2017    Flu-like symptoms 02/15/2017    Influenza A 02/15/2017     Resolved Ambulatory Problems     Diagnosis Date Noted    No Resolved Ambulatory Problems     No Additional Past Medical History         PAST SURGICAL HISTORY  Past Surgical History:   Procedure Laterality Date    KIDNEY STONE SURGERY  06/15/2021         FAMILY HISTORY  Family History   Problem Relation Age of Onset    Migraines Father          SOCIAL HISTORY  Social History     Socioeconomic History    Marital status: Single   Tobacco Use    Smoking status: Every Day     Packs/day: 1.00     Years: 1.00     Pack years: 1.00     Types: Cigarettes    Smokeless tobacco: Current    Tobacco comments:     vape   Vaping Use    Vaping Use: Every day    Substances: Nicotine    Devices: Disposable   Substance and Sexual Activity    Alcohol use: No    Drug use: No    Sexual activity: Not Currently         ALLERGIES  Nsaids        REVIEW OF SYSTEMS  Review of Systems   Constitutional:  Negative for  fever.   Respiratory:  Negative for shortness of breath.    Cardiovascular:  Negative for chest pain.   Genitourinary:  Positive for flank pain.   All other systems reviewed and are negative.         PHYSICAL EXAM  ED Triage Vitals [09/23/23 1320]   Temp Heart Rate Resp BP SpO2   96.9 °F (36.1 °C) 79 18 -- 97 %      Temp src Heart Rate Source Patient Position BP Location FiO2 (%)   Tympanic Monitor -- -- --       Physical Exam    GENERAL: Alert male in no obvious distress.  Triage vitals reviewed and are fairly unremarkable  HENT: nares patent  EYES: no scleral icterus  CV: regular rhythm, regular rate-no murmur  RESPIRATORY: normal effort, clear to auscultation bilaterally  ABDOMEN: soft, nontender to palpation  MUSCULOSKELETAL: no deformity  NEURO: Strength sensation and coordination are grossly intact.  Speech and mentation are unremarkable  SKIN: warm, dry      Vital signs and nursing notes reviewed.          LAB RESULTS  Recent Results (from the past 24 hour(s))   Comprehensive Metabolic Panel    Collection Time: 09/23/23  1:32 PM    Specimen: Blood   Result Value Ref Range    Glucose 137 (H) 65 - 99 mg/dL    BUN 13 6 - 20 mg/dL    Creatinine 1.15 0.76 - 1.27 mg/dL    Sodium 139 136 - 145 mmol/L    Potassium 4.1 3.5 - 5.2 mmol/L    Chloride 104 98 - 107 mmol/L    CO2 23.2 22.0 - 29.0 mmol/L    Calcium 9.7 8.6 - 10.5 mg/dL    Total Protein 7.5 6.0 - 8.5 g/dL    Albumin 4.4 3.5 - 5.2 g/dL    ALT (SGPT) 40 1 - 41 U/L    AST (SGOT) 24 1 - 40 U/L    Alkaline Phosphatase 74 39 - 117 U/L    Total Bilirubin 0.9 0.0 - 1.2 mg/dL    Globulin 3.1 gm/dL    A/G Ratio 1.4 g/dL    BUN/Creatinine Ratio 11.3 7.0 - 25.0    Anion Gap 11.8 5.0 - 15.0 mmol/L    eGFR 92.9 >60.0 mL/min/1.73   CBC Auto Differential    Collection Time: 09/23/23  1:32 PM    Specimen: Blood   Result Value Ref Range    WBC 10.74 3.40 - 10.80 10*3/mm3    RBC 5.23 4.14 - 5.80 10*6/mm3    Hemoglobin 14.5 13.0 - 17.7 g/dL    Hematocrit 43.6 37.5 - 51.0 %     MCV 83.4 79.0 - 97.0 fL    MCH 27.7 26.6 - 33.0 pg    MCHC 33.3 31.5 - 35.7 g/dL    RDW 13.3 12.3 - 15.4 %    RDW-SD 40.5 37.0 - 54.0 fl    MPV 9.6 6.0 - 12.0 fL    Platelets 267 140 - 450 10*3/mm3    Neutrophil % 57.8 42.7 - 76.0 %    Lymphocyte % 31.5 19.6 - 45.3 %    Monocyte % 7.4 5.0 - 12.0 %    Eosinophil % 2.5 0.3 - 6.2 %    Basophil % 0.5 0.0 - 1.5 %    Immature Grans % 0.3 0.0 - 0.5 %    Neutrophils, Absolute 6.22 1.70 - 7.00 10*3/mm3    Lymphocytes, Absolute 3.38 (H) 0.70 - 3.10 10*3/mm3    Monocytes, Absolute 0.79 0.10 - 0.90 10*3/mm3    Eosinophils, Absolute 0.27 0.00 - 0.40 10*3/mm3    Basophils, Absolute 0.05 0.00 - 0.20 10*3/mm3    Immature Grans, Absolute 0.03 0.00 - 0.05 10*3/mm3    nRBC 0.0 0.0 - 0.2 /100 WBC   Green Top (Gel)    Collection Time: 09/23/23  1:32 PM   Result Value Ref Range    Extra Tube Hold for add-ons.    Lavender Top    Collection Time: 09/23/23  1:32 PM   Result Value Ref Range    Extra Tube hold for add-on    Gold Top - SST    Collection Time: 09/23/23  1:32 PM   Result Value Ref Range    Extra Tube Hold for add-ons.    Light Blue Top    Collection Time: 09/23/23  1:32 PM   Result Value Ref Range    Extra Tube Hold for add-ons.    Urinalysis With Microscopic If Indicated (No Culture) - Urine, Clean Catch    Collection Time: 09/23/23  1:45 PM    Specimen: Urine, Clean Catch   Result Value Ref Range    Color, UA Dark Yellow (A) Yellow, Straw    Appearance, UA Turbid (A) Clear    pH, UA <=5.0 5.0 - 8.0    Specific Gravity, UA >=1.030 1.005 - 1.030    Glucose, UA Negative Negative    Ketones, UA Trace (A) Negative    Bilirubin, UA Negative Negative    Blood, UA Small (1+) (A) Negative    Protein, UA Trace (A) Negative    Leuk Esterase, UA Negative Negative    Nitrite, UA Negative Negative    Urobilinogen, UA 1.0 E.U./dL 0.2 - 1.0 E.U./dL   Urinalysis, Microscopic Only - Urine, Clean Catch    Collection Time: 09/23/23  1:45 PM    Specimen: Urine, Clean Catch   Result Value Ref Range     RBC, UA 6-12 (A) None Seen, 0-2 /HPF    WBC, UA 0-2 None Seen, 0-2 /HPF    Bacteria, UA None Seen None Seen /HPF    Squamous Epithelial Cells, UA 0-2 None Seen, 0-2 /HPF    Hyaline Casts, UA 7-12 None Seen /LPF    Methodology Automated Microscopy        Ordered the above labs and independently interpreted results. My findings will be discussed in the medical decision making section below        RADIOLOGY  CT Abdomen Pelvis Without Contrast    Result Date: 9/23/2023  CT OF THE ABDOMEN AND PELVIS WITHOUT CONTRAST 09/23/2023  HISTORY: Right flank pain. History of kidney stones.  TECHNIQUE: Axial images were obtained from the lung bases to the symphysis pubis. No intravenous or oral contrast was given.  FINDINGS: The liver, gallbladder, spleen, pancreas, adrenals and left kidney appear normal.  There is a punctate nonobstructing right renal stone on image 50. There is mild right hydronephrosis and hydroureter down to the distal right ureter where on image 145 there is a very tiny punctate calcification which appears to be in the distal right ureter just proximal to the right ureterovesical junction.  Urinary bladder is unremarkable.  No bowel wall thickening or bowel dilatation is seen.      1. Mild obstructive uropathy on the right from approximately 1 mm to 2 mm stone in the distal right ureter. 2. Punctate nonobstructing right renal stone.  Radiation dose reduction techniques were utilized, including automated exposure control and exposure modulation based on body size.        I ordered and independently reviewed the above noted radiographic studies.      I viewed images of CT abdomen which showed distal right ureteral stone per my independent interpretation.    See radiologist's dictation for official interpretation.             PROCEDURES  Procedures          MEDICATIONS GIVEN IN ER  Medications   HYDROmorphone (DILAUDID) injection 1 mg (1 mg Intravenous Given 9/23/23 1343)   ondansetron (ZOFRAN) injection 4 mg  (4 mg Intravenous Given 9/23/23 1342)   HYDROmorphone (DILAUDID) injection 1 mg (1 mg Intravenous Given 9/23/23 1514)   droperidol (INAPSINE) injection 2.5 mg (2.5 mg Intravenous Given 9/23/23 1514)               MEDICAL DECISION MAKING, PROGRESS, and CONSULTS    All labs have been independently reviewed by me.  All radiology studies have been reviewed by me and I have also reviewed the radiology report.   EKG's independently viewed and interpreted by me.  Discussion below represents my analysis of pertinent findings related to patient's condition, differential diagnosis, treatment plan and final disposition.      Additional sources:  - Discussed/ obtained information from independent historians: Mother and father at bedside    - External (non-ED) record review: Please see documented above    - Chronic or social conditions impacting care: History of prior kidney stone    - Shared decision making: I discussed ED evaluation and treatment plan with patient who is in agreement.  Presents with abrupt onset of right flank pain    Treated with IV pain and IV antiemetics.    Labs fairly benign.  CT scan revealed 1 to 2 mm right distal ureteral stone.  Will treat outpatient with oral pain medication and urology referral.      Orders placed during this visit:  Orders Placed This Encounter   Procedures    CT Abdomen Pelvis Without Contrast    Comprehensive Metabolic Panel    Urinalysis With Microscopic If Indicated (No Culture) - Urine, Clean Catch    CBC Auto Differential    [place] Draw    Urinalysis, Microscopic Only - Urine, Clean Catch    CBC & Differential    Green Top (Gel)    Lavender Top    Gold Top - SST    Gray Top    Light Blue Top           Differential diagnosis:    Please see as documented below in ED course      Independent interpretation of labs, radiology studies, and discussions with consultants:  ED Course as of 09/23/23 1549   Sat Sep 23, 2023   [age] 89-rfjh male with history of kidney stones  presents with abrupt onset of right flank pain    On exam he is quite uncomfortable and writhing in the bed.  He does not have significant tenderness palpation and is afebrile.    Assessment-suspect likely kidney stone.  We will treat with IV Dilaudid and Zofran.  We will get routine labs to look for signs of infection, renal failure or electrolyte disturbance.    Other causes abrupt onset of pain would include diverticulitis, appendicitis and cholecystitis but feel that these are less likely than kidney stone. [DB]   1432 CT scan independently interpreted by me shows a distal small right ureteral stone.    Official interpretation reports 1 to 2 mm stone with mild hydro-. [DB]   1432 Labs reviewed are relatively benign without leukocytosis or anemia.  Chemistries unremarkable.  Urinalysis shows small amount of blood. [DB]   1432 I discussed results of testing with mother and patient at bedside.  We discussed CT scan and size of the stone as well as his labs.    Given the size of his stone and benign labs I suspect he can be treated as an outpatient with outpatient urology referral.    We will go ahead and give a repeat dose of pain medication as well as some droperidol to help with nausea. [DB]      ED Course User Index  [DB] Jeffrey Ferguson MD               DIAGNOSIS  Final diagnoses:   Kidney stone         DISPOSITION  DISCHARGE    Patient discharged in stable condition.    Reviewed implications of results, diagnosis, meds, responsibility to follow up, warning signs and symptoms of possible worsening, potential complications and reasons to return to ER, including increased pain, fever or as needed.    Patient/Family voiced understanding of above instructions.    Discussed plan for discharge, as there is no emergent indication for admission. Patient referred to primary care provider for BP management due to today's BP. Pt/family is agreeable and understands need for follow up and repeat testing.  Pt is aware that  discharge does not mean that nothing is wrong but it indicates no emergency is present that requires admission and they must continue care with follow-up as given below or physician of their choice.     FOLLOW-UP  Kamran Malave MD  [address]  [facility] [address] [phone]    In 3 days  If Not Better, Call for Appointment         Medication List        New Prescriptions      oxyCODONE-acetaminophen 7.5-325 MG per tablet  Commonly known as: Percocet  Take 1-2 tablets by mouth Every 6 (Six) Hours As Needed for Severe Pain.     promethazine 25 MG tablet  Commonly known as: PHENERGAN  Take 1 tablet by mouth Every 6 (Six) Hours As Needed for Nausea or Vomiting.               Where to Get Your Medications        These medications were sent to [facility]/pharmacy #[id number] - [facility], [place] - [address] AT [facility] - [phone] [facility] [phone]   [address] [facility] [address]      Phone: [phone]   oxyCODONE-acetaminophen 7.5-325 MG per tablet  promethazine 25 MG tablet                   Latest Documented Vital Signs:  As of 15:49 EDT  BP- 100/73 HR- 59 Temp- 96.9 °F (36.1 °C) (Tympanic) O2 sat- 96%      ANTHONY reviewed by Jeffrey Ferguson MD       --    Please note that portions of this were completed with a voice recognition program.       Note Disclaimer: At [facility], we believe that sharing information builds trust and better relationships. You are receiving this note because you are receiving care at [facility] or recently visited. It is possible you will see health information before a provider has talked with you about it. This kind of information can be easy to misunderstand. To help you fully understand what it means for your health, we urge you to discuss this note with your provider.             Jeffrey Ferguson MD  09/23/23 3854